# Patient Record
Sex: FEMALE | Race: WHITE | NOT HISPANIC OR LATINO | URBAN - METROPOLITAN AREA
[De-identification: names, ages, dates, MRNs, and addresses within clinical notes are randomized per-mention and may not be internally consistent; named-entity substitution may affect disease eponyms.]

---

## 2019-12-16 ENCOUNTER — APPOINTMENT (RX ONLY)
Dept: URBAN - METROPOLITAN AREA CLINIC 45 | Facility: CLINIC | Age: 59
Setting detail: DERMATOLOGY
End: 2019-12-16

## 2019-12-16 DIAGNOSIS — L82.1 OTHER SEBORRHEIC KERATOSIS: ICD-10-CM

## 2019-12-16 DIAGNOSIS — D22 MELANOCYTIC NEVI: ICD-10-CM

## 2019-12-16 DIAGNOSIS — L57.0 ACTINIC KERATOSIS: ICD-10-CM

## 2019-12-16 DIAGNOSIS — L81.4 OTHER MELANIN HYPERPIGMENTATION: ICD-10-CM

## 2019-12-16 DIAGNOSIS — L93.0 DISCOID LUPUS ERYTHEMATOSUS: ICD-10-CM

## 2019-12-16 DIAGNOSIS — D18.0 HEMANGIOMA: ICD-10-CM

## 2019-12-16 DIAGNOSIS — Z85.828 PERSONAL HISTORY OF OTHER MALIGNANT NEOPLASM OF SKIN: ICD-10-CM

## 2019-12-16 PROBLEM — D18.01 HEMANGIOMA OF SKIN AND SUBCUTANEOUS TISSUE: Status: ACTIVE | Noted: 2019-12-16

## 2019-12-16 PROBLEM — D22.5 MELANOCYTIC NEVI OF TRUNK: Status: ACTIVE | Noted: 2019-12-16

## 2019-12-16 PROCEDURE — 99214 OFFICE O/P EST MOD 30 MIN: CPT | Mod: 25

## 2019-12-16 PROCEDURE — ? LIQUID NITROGEN

## 2019-12-16 PROCEDURE — ? MEDICATION COUNSELING

## 2019-12-16 PROCEDURE — 17000 DESTRUCT PREMALG LESION: CPT

## 2019-12-16 PROCEDURE — ? SUNSCREEN RECOMMENDATIONS

## 2019-12-16 PROCEDURE — ? FULL BODY SKIN EXAM

## 2019-12-16 PROCEDURE — ? COUNSELING

## 2019-12-16 PROCEDURE — ? PRESCRIPTION

## 2019-12-16 RX ORDER — CLOBETASOL PROPIONATE 0.5 MG/ML
SOLUTION TOPICAL BID
Qty: 1 | Refills: 6 | Status: ERX | COMMUNITY
Start: 2019-12-16

## 2019-12-16 RX ADMIN — CLOBETASOL PROPIONATE: 0.5 SOLUTION TOPICAL at 00:00

## 2019-12-16 ASSESSMENT — LOCATION SIMPLE DESCRIPTION DERM
LOCATION SIMPLE: LEFT THIGH
LOCATION SIMPLE: ABDOMEN
LOCATION SIMPLE: POSTERIOR SCALP

## 2019-12-16 ASSESSMENT — LOCATION DETAILED DESCRIPTION DERM
LOCATION DETAILED: RIGHT SUPERIOR OCCIPITAL SCALP
LOCATION DETAILED: RIGHT RIB CAGE
LOCATION DETAILED: LEFT ANTERIOR DISTAL THIGH
LOCATION DETAILED: PERIUMBILICAL SKIN
LOCATION DETAILED: LEFT LATERAL ABDOMEN
LOCATION DETAILED: LEFT RIB CAGE
LOCATION DETAILED: RIGHT LATERAL ABDOMEN

## 2019-12-16 ASSESSMENT — LOCATION ZONE DERM
LOCATION ZONE: LEG
LOCATION ZONE: SCALP
LOCATION ZONE: TRUNK

## 2019-12-16 NOTE — PROCEDURE: MEDICATION COUNSELING
no Taltz Pregnancy And Lactation Text: The risk during pregnancy and breastfeeding is uncertain with this medication.

## 2019-12-16 NOTE — PROCEDURE: MEDICATION COUNSELING
Xelelliottz Pregnancy And Lactation Text: This medication is Pregnancy Category D and is not considered safe during pregnancy.  The risk during breast feeding is also uncertain.

## 2019-12-16 NOTE — HPI: EVALUATION OF SKIN LESION(S)
What Type Of Note Output Would You Prefer (Optional)?: Bullet Format
Hpi Title: Evaluation of Skin Lesions
How Severe Are Your Spot(S)?: mild
Have Your Spot(S) Been Treated In The Past?: has not been treated
Additional History: Last FSE 11/2018 [facility]. Hx discoid lupus erythematosus. Requesting refills for Clobetasol solution

## 2020-12-14 ENCOUNTER — APPOINTMENT (RX ONLY)
Dept: URBAN - METROPOLITAN AREA CLINIC 45 | Facility: CLINIC | Age: 60
Setting detail: DERMATOLOGY
End: 2020-12-14

## 2020-12-14 ENCOUNTER — RX ONLY (OUTPATIENT)
Age: 60
Setting detail: RX ONLY
End: 2020-12-14

## 2020-12-14 DIAGNOSIS — Z12.83 ENCOUNTER FOR SCREENING FOR MALIGNANT NEOPLASM OF SKIN: ICD-10-CM

## 2020-12-14 DIAGNOSIS — Z87.2 PERSONAL HISTORY OF DISEASES OF THE SKIN AND SUBCUTANEOUS TISSUE: ICD-10-CM

## 2020-12-14 DIAGNOSIS — D22 MELANOCYTIC NEVI: ICD-10-CM

## 2020-12-14 DIAGNOSIS — L81.4 OTHER MELANIN HYPERPIGMENTATION: ICD-10-CM

## 2020-12-14 DIAGNOSIS — L93.0 DISCOID LUPUS ERYTHEMATOSUS: ICD-10-CM | Status: STABLE

## 2020-12-14 DIAGNOSIS — D18.0 HEMANGIOMA: ICD-10-CM

## 2020-12-14 DIAGNOSIS — L91.8 OTHER HYPERTROPHIC DISORDERS OF THE SKIN: ICD-10-CM

## 2020-12-14 DIAGNOSIS — L82.1 OTHER SEBORRHEIC KERATOSIS: ICD-10-CM

## 2020-12-14 DIAGNOSIS — Z85.828 PERSONAL HISTORY OF OTHER MALIGNANT NEOPLASM OF SKIN: ICD-10-CM

## 2020-12-14 PROBLEM — D22.9 MELANOCYTIC NEVI, UNSPECIFIED: Status: ACTIVE | Noted: 2020-12-14

## 2020-12-14 PROBLEM — D18.01 HEMANGIOMA OF SKIN AND SUBCUTANEOUS TISSUE: Status: ACTIVE | Noted: 2020-12-14

## 2020-12-14 PROCEDURE — ? ADDITIONAL NOTES

## 2020-12-14 PROCEDURE — ? TREATMENT REGIMEN

## 2020-12-14 PROCEDURE — ? FULL BODY SKIN EXAM

## 2020-12-14 PROCEDURE — ? MEDICATION COUNSELING

## 2020-12-14 PROCEDURE — 99214 OFFICE O/P EST MOD 30 MIN: CPT

## 2020-12-14 PROCEDURE — ? COUNSELING

## 2020-12-14 RX ORDER — CLOBETASOL PROPIONATE 0.5 MG/ML
SOLUTION TOPICAL BID
Qty: 1 | Refills: 2 | Status: ERX

## 2020-12-14 ASSESSMENT — LOCATION SIMPLE DESCRIPTION DERM
LOCATION SIMPLE: LEFT ANTERIOR NECK
LOCATION SIMPLE: SCALP

## 2020-12-14 ASSESSMENT — LOCATION DETAILED DESCRIPTION DERM
LOCATION DETAILED: RIGHT SUPERIOR PARIETAL SCALP
LOCATION DETAILED: LEFT INFERIOR ANTERIOR NECK

## 2020-12-14 ASSESSMENT — LOCATION ZONE DERM
LOCATION ZONE: NECK
LOCATION ZONE: SCALP

## 2020-12-14 NOTE — PROCEDURE: TREATMENT REGIMEN
Detail Level: Zone
Plan: Has been off Plaquenil for 2-3 months, continue to hold given stable, to notify us if flares.
Continue Regimen: Clobetasol scalp sol prn for flares and refills sent in.

## 2021-11-01 ENCOUNTER — OFFICE VISIT (OUTPATIENT)
Dept: URBAN - METROPOLITAN AREA SURGERY CENTER 18 | Facility: SURGERY CENTER | Age: 61
End: 2021-11-01
Payer: COMMERCIAL

## 2021-11-01 DIAGNOSIS — D12.5 ADENOMA OF SIGMOID COLON: ICD-10-CM

## 2021-11-01 DIAGNOSIS — Z86.010 ADENOMAS PERSONAL HISTORY OF COLONIC POLYPS: ICD-10-CM

## 2021-11-01 PROCEDURE — G8907 PT DOC NO EVENTS ON DISCHARG: HCPCS | Performed by: INTERNAL MEDICINE

## 2021-11-01 PROCEDURE — 45380 COLONOSCOPY AND BIOPSY: CPT | Performed by: INTERNAL MEDICINE

## 2021-12-22 ENCOUNTER — APPOINTMENT (RX ONLY)
Dept: URBAN - METROPOLITAN AREA CLINIC 45 | Facility: CLINIC | Age: 61
Setting detail: DERMATOLOGY
End: 2021-12-22

## 2021-12-22 DIAGNOSIS — H00.01 HORDEOLUM EXTERNUM: ICD-10-CM

## 2021-12-22 DIAGNOSIS — L93.0 DISCOID LUPUS ERYTHEMATOSUS: ICD-10-CM | Status: STABLE

## 2021-12-22 PROBLEM — L30.9 DERMATITIS, UNSPECIFIED: Status: ACTIVE | Noted: 2021-12-22

## 2021-12-22 PROCEDURE — ? PRESCRIPTION MEDICATION MANAGEMENT

## 2021-12-22 PROCEDURE — ? COUNSELING

## 2021-12-22 PROCEDURE — ? MEDICATION COUNSELING

## 2021-12-22 PROCEDURE — ? PRESCRIPTION

## 2021-12-22 PROCEDURE — ? ADDITIONAL NOTES

## 2021-12-22 PROCEDURE — ? FULL BODY SKIN EXAM - DECLINED

## 2021-12-22 PROCEDURE — 99213 OFFICE O/P EST LOW 20 MIN: CPT

## 2021-12-22 RX ORDER — DOXYCYCLINE HYCLATE 100 MG/1
CAPSULE, GELATIN COATED ORAL
Qty: 20 | Refills: 0 | Status: ERX | COMMUNITY
Start: 2021-12-22

## 2021-12-22 RX ORDER — VALACYCLOVIR 1 G/1
TABLET, FILM COATED ORAL
Qty: 21 | Refills: 0 | Status: ERX | COMMUNITY
Start: 2021-12-22

## 2021-12-22 RX ORDER — CLOBETASOL PROPIONATE 0.5 MG/ML
SOLUTION TOPICAL
Qty: 50 | Refills: 1 | Status: ERX | COMMUNITY
Start: 2021-12-22

## 2021-12-22 RX ADMIN — VALACYCLOVIR: 1 TABLET, FILM COATED ORAL at 00:00

## 2021-12-22 RX ADMIN — DOXYCYCLINE HYCLATE: 100 CAPSULE, GELATIN COATED ORAL at 00:00

## 2021-12-22 RX ADMIN — CLOBETASOL PROPIONATE: 0.5 SOLUTION TOPICAL at 00:00

## 2021-12-22 ASSESSMENT — LOCATION DETAILED DESCRIPTION DERM: LOCATION DETAILED: LEFT MEDIAL SUPERIOR TARSAL REGION

## 2021-12-22 ASSESSMENT — LOCATION ZONE DERM: LOCATION ZONE: EYELID

## 2021-12-22 ASSESSMENT — LOCATION SIMPLE DESCRIPTION DERM: LOCATION SIMPLE: LEFT MEDIAL SUPERIOR TARSAL REGION

## 2021-12-22 NOTE — PROCEDURE: PRESCRIPTION MEDICATION MANAGEMENT
Detail Level: Zone
Render In Strict Bullet Format?: No
Initiate Treatment: doxycycline hyclate 100 mg capsule BID x 10 days\\nQuantity: 20.0 Capsule\\nSig: Take one pill twice daily for 10 days. Take with a full glass of water, do not lie down until 1 hour after taking.\\n\\nvalacyclovir 1 gram tablet \\nQuantity: 21.0 Tablet\\nSig: Take one tab TID for 7 days
Continue Regimen: clobetasol 0.05 % scalp solution \\nQuantity: 50.0 ml\\nSig: Apply to scalp bid x 2 weeks then as needed

## 2021-12-22 NOTE — PROCEDURE: ADDITIONAL NOTES
Additional Notes: Patient consent was obtained to proceed with the visit and recommended plan of care after discussion of all risks and benefits, including the risks of COVID-19 exposure.
Detail Level: Simple
Additional Notes: Advised pt to use hot compresses
Render Risk Assessment In Note?: no
Additional Notes: pt with likely hordeolum and adjacent is a pustule with mild erythema pt reports present for 5 days now so very unlikely its zoster but cannot r/o possible HSV. pt does admit to getting cold sores but has never had any lesions on eye area.  cannot r/o possible bacterial infection. pt denies vision changes, pain to left side of face, headaches.  referred  pt to see ophthalmologist

## 2021-12-22 NOTE — HPI: SKIN LESION
What Type Of Note Output Would You Prefer (Optional)?: Bullet Format
How Severe Is Your Skin Lesion?: mild
Has Your Skin Lesion Been Treated?: not been treated
Is This A New Presentation, Or A Follow-Up?: Skin Lesion
Additional History: No new products, no new makeup

## 2022-03-21 ENCOUNTER — APPOINTMENT (RX ONLY)
Dept: URBAN - METROPOLITAN AREA CLINIC 45 | Facility: CLINIC | Age: 62
Setting detail: DERMATOLOGY
End: 2022-03-21

## 2022-03-21 DIAGNOSIS — L30.9 DERMATITIS, UNSPECIFIED: ICD-10-CM | Status: INADEQUATELY CONTROLLED

## 2022-03-21 PROCEDURE — ? TREATMENT REGIMEN

## 2022-03-21 PROCEDURE — 99214 OFFICE O/P EST MOD 30 MIN: CPT

## 2022-03-21 PROCEDURE — ? MEDICATION COUNSELING

## 2022-03-21 PROCEDURE — ? PRESCRIPTION

## 2022-03-21 PROCEDURE — ? COUNSELING

## 2022-03-21 PROCEDURE — ? ADDITIONAL NOTES

## 2022-03-21 PROCEDURE — ? FULL BODY SKIN EXAM - DECLINED

## 2022-03-21 RX ORDER — SULFACETAMIDE SODIUM AND SULFUR 100; 50 MG/G; MG/G
CREAM TOPICAL QD
Qty: 170 | Refills: 1 | Status: ERX | COMMUNITY
Start: 2022-03-21

## 2022-03-21 RX ORDER — DOXYCYCLINE HYCLATE 100 MG/1
CAPSULE, GELATIN COATED ORAL BID
Qty: 60 | Refills: 0 | Status: ERX

## 2022-03-21 RX ADMIN — SULFACETAMIDE SODIUM AND SULFUR: 100; 50 CREAM TOPICAL at 00:00

## 2022-03-21 ASSESSMENT — LOCATION ZONE DERM: LOCATION ZONE: EYELID

## 2022-03-21 ASSESSMENT — LOCATION SIMPLE DESCRIPTION DERM
LOCATION SIMPLE: RIGHT SUPERIOR EYELID
LOCATION SIMPLE: LEFT SUPERIOR EYELID

## 2022-03-21 ASSESSMENT — LOCATION DETAILED DESCRIPTION DERM
LOCATION DETAILED: LEFT LATERAL SUPERIOR EYELID
LOCATION DETAILED: RIGHT LATERAL SUPERIOR EYELID

## 2022-03-21 NOTE — PROCEDURE: MIPS QUALITY
Quality 130: Documentation Of Current Medications In The Medical Record: Current Medications Documented
Quality 431: Preventive Care And Screening: Unhealthy Alcohol Use - Screening: Patient not identified as an unhealthy alcohol user when screened for unhealthy alcohol use using a systematic screening method
Quality 110: Preventive Care And Screening: Influenza Immunization: Influenza Immunization not Administered for Documented Reasons.
Detail Level: Detailed
Quality 226: Preventive Care And Screening: Tobacco Use: Screening And Cessation Intervention: Patient screened for tobacco use and is an ex/non-smoker

## 2022-03-21 NOTE — PROCEDURE: TREATMENT REGIMEN
Detail Level: Zone
Otc Regimen: Flonase bid and Claritin qd
Plan: will biopsy a small papule at next visit if not resolving
Continue Regimen: Hot compresses

## 2022-03-21 NOTE — PROCEDURE: MEDICATION COUNSELING
Yes Calcipotriene Counseling:  I discussed with the patient the risks of calcipotriene including but not limited to erythema, scaling, itching, and irritation.

## 2022-04-25 ENCOUNTER — APPOINTMENT (RX ONLY)
Dept: URBAN - METROPOLITAN AREA CLINIC 45 | Facility: CLINIC | Age: 62
Setting detail: DERMATOLOGY
End: 2022-04-25

## 2022-04-25 DIAGNOSIS — L71.8 OTHER ROSACEA: ICD-10-CM | Status: INADEQUATELY CONTROLLED

## 2022-04-25 PROBLEM — L30.9 DERMATITIS, UNSPECIFIED: Status: ACTIVE | Noted: 2022-04-25

## 2022-04-25 PROBLEM — D48.5 NEOPLASM OF UNCERTAIN BEHAVIOR OF SKIN: Status: ACTIVE | Noted: 2022-04-25

## 2022-04-25 PROCEDURE — 11102 TANGNTL BX SKIN SINGLE LES: CPT

## 2022-04-25 PROCEDURE — ? ADDITIONAL NOTES

## 2022-04-25 PROCEDURE — 11103 TANGNTL BX SKIN EA SEP/ADDL: CPT

## 2022-04-25 PROCEDURE — ? BIOPSY BY SHAVE METHOD

## 2022-04-25 PROCEDURE — ? FULL BODY SKIN EXAM

## 2022-04-25 ASSESSMENT — LOCATION ZONE DERM: LOCATION ZONE: EYELID

## 2022-04-25 ASSESSMENT — LOCATION SIMPLE DESCRIPTION DERM: LOCATION SIMPLE: LEFT SUPERIOR EYELID

## 2022-04-25 ASSESSMENT — LOCATION DETAILED DESCRIPTION DERM: LOCATION DETAILED: LEFT LATERAL SUPERIOR EYELID

## 2022-04-25 NOTE — PROCEDURE: BIOPSY BY SHAVE METHOD
Detail Level: Detailed
Depth Of Biopsy: dermis
Was A Bandage Applied: Yes
Size Of Lesion In Cm: 0.3
X Size Of Lesion In Cm: 0
Biopsy Type: H and E
Biopsy Method: Personna blade
Anesthesia Type: 2% lidocaine with epinephrine and a 1:10 solution of 8.4% sodium bicarbonate
Anesthesia Volume In Cc (Will Not Render If 0): 1
Hemostasis: Aluminum Chloride
Wound Care: Petrolatum
Dressing: bandage
Destruction After The Procedure: No
Type Of Destruction Used: Curettage
Curettage Text: The wound bed was treated with curettage after the biopsy was performed.
Cryotherapy Text: The wound bed was treated with cryotherapy after the biopsy was performed.
Electrodesiccation Text: The wound bed was treated with electrodesiccation after the biopsy was performed.
Electrodesiccation And Curettage Text: The wound bed was treated with electrodesiccation and curettage after the biopsy was performed.
Silver Nitrate Text: The wound bed was treated with silver nitrate after the biopsy was performed.
Lab: [id number]
Lab Facility: [id number]
Consent: Written consent was obtained and risks were reviewed including but not limited to scarring, infection, bleeding, scabbing, incomplete removal, nerve damage and allergy to anesthesia.
Post-Care Instructions: I reviewed with the patient in detail post-care instructions. Patient is to keep the biopsy site dry overnight, and then apply aquaphor or vaseline twice daily until healed.
Notification Instructions: Patient will be notified of biopsy results. However, patient instructed to call the office if not contacted within 2 weeks.
Billing Type: Third-Party Bill
Information: Selecting Yes will display possible errors in your note based on the variables you have selected. This validation is only offered as a suggestion for you. PLEASE NOTE THAT THE VALIDATION TEXT WILL BE REMOVED WHEN YOU FINALIZE YOUR NOTE. IF YOU WANT TO FAX A PRELIMINARY NOTE YOU WILL NEED TO TOGGLE THIS TO 'NO' IF YOU DO NOT WANT IT IN YOUR FAXED NOTE.
Size Of Lesion In Cm: 0.4

## 2022-04-25 NOTE — PROCEDURE: ADDITIONAL NOTES
Additional Notes: Patient will continue doxycycline and sodium-sulfur topical cleanser until next appointment where biopsy results will be reviewed.\\n\\ncan do punch biopsy next if needed
Render Risk Assessment In Note?: no
Detail Level: Simple

## 2022-05-03 ENCOUNTER — RX ONLY (OUTPATIENT)
Age: 62
Setting detail: RX ONLY
End: 2022-05-03

## 2022-05-03 RX ORDER — IVERMECTIN 3 MG/1
TABLET ORAL AS DIRECTED
Qty: 20 | Refills: 0 | Status: ERX | COMMUNITY
Start: 2022-05-03

## 2022-06-06 ENCOUNTER — RX ONLY (OUTPATIENT)
Age: 62
Setting detail: RX ONLY
End: 2022-06-06

## 2022-06-06 RX ORDER — IVERMECTIN 3 MG/1
TABLET ORAL AS DIRECTED
Qty: 20 | Refills: 0 | Status: ERX

## 2022-12-03 NOTE — PROCEDURE: MEDICATION COUNSELING
impairments found Erivedge Counseling- I discussed with the patient the risks of Erivedge including but not limited to nausea, vomiting, diarrhea, constipation, weight loss, changes in the sense of taste, decreased appetite, muscle spasms, and hair loss.  The patient verbalized understanding of the proper use and possible adverse effects of Erivedge.  All of the patient's questions and concerns were addressed.

## 2023-06-01 NOTE — PROCEDURE: MEDICATION COUNSELING
Cyclosporine Pregnancy And Lactation Text: This medication is Pregnancy Category C and it isn't know if it is safe during pregnancy. This medication is excreted in breast milk. Cimzia Pregnancy And Lactation Text: This medication crosses the placenta but can be considered safe in certain situations. Cimzia may be excreted in breast milk.